# Patient Record
Sex: FEMALE | Race: BLACK OR AFRICAN AMERICAN | NOT HISPANIC OR LATINO | Employment: FULL TIME | URBAN - METROPOLITAN AREA
[De-identification: names, ages, dates, MRNs, and addresses within clinical notes are randomized per-mention and may not be internally consistent; named-entity substitution may affect disease eponyms.]

---

## 2021-08-09 ENCOUNTER — HISTORICAL (OUTPATIENT)
Dept: ADMINISTRATIVE | Facility: HOSPITAL | Age: 46
End: 2021-08-09

## 2021-08-09 LAB — SARS-COV-2 RNA RESP QL NAA+PROBE: NOT DETECTED

## 2022-04-11 ENCOUNTER — HISTORICAL (OUTPATIENT)
Dept: ADMINISTRATIVE | Facility: HOSPITAL | Age: 47
End: 2022-04-11

## 2022-04-27 VITALS
SYSTOLIC BLOOD PRESSURE: 130 MMHG | BODY MASS INDEX: 34.85 KG/M2 | HEIGHT: 65 IN | OXYGEN SATURATION: 97 % | WEIGHT: 209.19 LBS | DIASTOLIC BLOOD PRESSURE: 84 MMHG

## 2022-05-24 ENCOUNTER — TELEPHONE (OUTPATIENT)
Dept: GYNECOLOGY | Facility: CLINIC | Age: 47
End: 2022-05-24
Payer: MEDICAID

## 2022-05-24 NOTE — TELEPHONE ENCOUNTER
Scheduled patient for 6/8 at 2:00PM.       ----- Message from Itzel Agrawal sent at 5/23/2022  3:00 PM CDT -----  Regarding: Add to GYN shanna  Per Dr. Morin to move this pt in 2 wks. Can you add a slot for 6/8 @ 2:15p. Thanks and let me know

## 2022-06-08 ENCOUNTER — OFFICE VISIT (OUTPATIENT)
Dept: GYNECOLOGY | Facility: CLINIC | Age: 47
End: 2022-06-08
Payer: MEDICAID

## 2022-06-08 VITALS
RESPIRATION RATE: 18 BRPM | SYSTOLIC BLOOD PRESSURE: 119 MMHG | WEIGHT: 213.88 LBS | TEMPERATURE: 99 F | OXYGEN SATURATION: 99 % | DIASTOLIC BLOOD PRESSURE: 79 MMHG | HEART RATE: 83 BPM | HEIGHT: 66 IN | BODY MASS INDEX: 34.37 KG/M2

## 2022-06-08 DIAGNOSIS — N93.9 ABNORMAL UTERINE BLEEDING (AUB): Primary | ICD-10-CM

## 2022-06-08 DIAGNOSIS — Z00.00 HEALTHCARE MAINTENANCE: ICD-10-CM

## 2022-06-08 DIAGNOSIS — Z12.4 CERVICAL CANCER SCREENING: ICD-10-CM

## 2022-06-08 PROCEDURE — 87624 HPV HI-RISK TYP POOLED RSLT: CPT

## 2022-06-08 PROCEDURE — 99214 OFFICE O/P EST MOD 30 MIN: CPT | Mod: PBBFAC

## 2022-06-08 RX ORDER — LISINOPRIL 20 MG/1
20 TABLET ORAL DAILY
COMMUNITY
Start: 2022-05-28

## 2022-06-08 RX ORDER — ASPIRIN 325 MG
50000 TABLET, DELAYED RELEASE (ENTERIC COATED) ORAL
COMMUNITY
Start: 2022-06-06

## 2022-06-08 RX ORDER — NORETHINDRONE 5 MG/1
5 TABLET ORAL DAILY
Qty: 30 TABLET | Refills: 11 | Status: SHIPPED | OUTPATIENT
Start: 2022-06-08 | End: 2022-09-16

## 2022-06-08 RX ORDER — FERROUS SULFATE 325(65) MG
TABLET, DELAYED RELEASE (ENTERIC COATED) ORAL 2 TIMES DAILY
COMMUNITY
Start: 2022-03-31

## 2022-06-08 RX ORDER — DOCUSATE SODIUM 100 MG/1
100 CAPSULE, LIQUID FILLED ORAL 2 TIMES DAILY
COMMUNITY
Start: 2022-06-06

## 2022-06-08 NOTE — PROGRESS NOTES
[facility] GYNECOLOGY CLINIC NOTE     Maura Garcia is a 46 y.o.  presenting to GYN clinic for 3 years of menorrhagia.    Patient reports heavy bleeding for the past 3 years. She states that she always had heavier periods but they have increased over the past 3 years. She denies any pain or other discharge. Her periods occur regularly every month with no intermittent bleeding. She states that she bleeds for 7 days and uses about 8 pads and tampons a day. Patient also does not remember the last time that she has a Pap smear, but doesn't remember having any abnormal pap smears. Has previously had an IUD after her first pregnancy over 20 years ago but doesn't remember if it was a copper or hormonal IUD and reports heavier bleeding with it. Also tried OCPs after which had made her nauseous, but doesn't remember which ones she had taken and has not taken them in many years.     Gynecology  10/R/7  Denies h/o STIs or abnormal pap smears    OB History        2    Para   1    Term   1            AB   1    Living   1       SAB   1    IAB        Ectopic        Multiple        Live Births   1                Past Medical History:   Diagnosis Date    Anemia     Breast disorder 2015    cyst removed    Hypertension       Past Surgical History:   Procedure Laterality Date     SECTION      CHOLECYSTECTOMY        Current Outpatient Medications   Medication Instructions    cholecalciferol (vitamin D3) 50,000 Units, Oral, Every 7 days    docusate sodium (COLACE) 100 mg, Oral, 2 times daily    ferrous sulfate 325 (65 FE) MG EC tablet Oral, 2 times daily    lisinopriL (PRINIVIL,ZESTRIL) 20 mg, Oral, Daily    norethindrone (AYGESTIN) 5 mg, Oral, Daily     Social History     Tobacco Use    Smoking status: Never Smoker    Smokeless tobacco: Never Used   Substance Use Topics    Alcohol use: Yes     Alcohol/week: 2.0 standard drinks     Types: 2 Glasses of wine per week     Comment: occassionally     "Drug use: No       Review of Systems  Pertinent items are noted in HPI.     Objective:     /79 (BP Location: Left arm)   Pulse 83   Temp 98.6 °F (37 °C) (Oral)   Resp 18   Ht 5' 6" (1.676 m)   Wt 97 kg (213 lb 13.5 oz)   LMP 2022   SpO2 99%   BMI 34.52 kg/m²   Physical Exam:  Gen: Well-nourished, well-developed female appearing stated age. Alert, cooperative, in no acute distress.  HEENT: No thyromegaly, goiter, or masses  CV: rrr, no murmurs/rubs/gallops  Chest: ctabl, no wheezes/rales/rhonchi  Abdomen: Soft, non-tender, no masses.  Extrem: Extremities normal, atraumatic, non-tender calves.  External genitalia: Normal female genetalia without lesion, discharge or tenderness.   Speculum Exam: Vaginal vault with blood present; without discharge, nonodorous, no lesions/masses seen.  Cervical os visualized as closed, no lesions/masses.   Bimanual Exam: No cervical motion tenderness.  Uterus freely mobile. Uterus approx 10wga, globular. No adnexal masses.  Nontender exam.   Note: RN chaperone present for entirety of exam.    Relevant Labs:   None    Relevant Imaging:  None      Assessment:       46 y.o.  here for heavy menstrual bleeding for the past 3 years.  1. Abnormal uterine bleeding (AUB)  CBC Auto Differential    US Pelvis Comp with Transvag NON-OB (xpd    TSH   2. Cervical cancer screening  Liquid-Based Pap Smear, Screening Screening   3. Healthcare maintenance            Plan:     Problem List Items Addressed This Visit        Renal/    Abnormal uterine bleeding (AUB) - Primary     Discussed management for bleeding with patient as she has not tried medical management in years.  Discussed PO vs IUD and patient opts for PO management at this time. Will start on norethindrone for management.     - Patient has hx of anemia before AUB; takes iron supplements. CBC, TSH ordered today.  - US pelvis ordered  - Start Norethindrone           Relevant Orders    CBC Auto Differential    US " Pelvis Comp with Transvag NON-OB (xpd    TSH    Cervical cancer screening    Relevant Orders    Liquid-Based Pap Smear, Screening Screening       Other    Healthcare maintenance     Follows mammograms with outside doctor and has one scheduled for next month.                 Follow-up in 2 months with virtual visit  o Discuss possible IUD placement    Discussed patient and plan with Dr. Provost Roberto Carlos Nolan, MS3

## 2022-06-13 PROBLEM — N93.9 ABNORMAL UTERINE BLEEDING (AUB): Status: ACTIVE | Noted: 2022-06-13

## 2022-06-13 PROBLEM — Z00.00 HEALTHCARE MAINTENANCE: Status: ACTIVE | Noted: 2022-06-13

## 2022-06-13 PROBLEM — Z12.4 CERVICAL CANCER SCREENING: Status: ACTIVE | Noted: 2022-06-13

## 2022-06-13 NOTE — ASSESSMENT & PLAN NOTE
Discussed management for bleeding with patient as she has not tried medical management in years.  Discussed PO vs IUD and patient opts for PO management at this time. Will start on norethindrone for management.     - Patient has hx of anemia before AUB; takes iron supplements. CBC, TSH ordered today.  - US pelvis ordered  - Start Norethindrone

## 2022-06-15 LAB
HPV16+18+H RISK 12 DNA CVX-IMP: NEGATIVE
INSULIN SERPL-ACNC: NORMAL U[IU]/ML
LAB AP BETHESDA CATEGORY: NORMAL
LAB AP GYN MOLECULAR TESTING: NORMAL
LAB AP LMP DATE: NORMAL
LAB AP OCHS PAP SPECIMEN ADEQUACY: NORMAL
LAB AP OHS PAP INTERPRETATION: NORMAL
LAB AP PAP DISCLAIMER COMMENTS: NORMAL

## 2022-06-23 ENCOUNTER — HOSPITAL ENCOUNTER (OUTPATIENT)
Dept: RADIOLOGY | Facility: HOSPITAL | Age: 47
Discharge: HOME | End: 2022-06-23
Attending: STUDENT IN AN ORGANIZED HEALTH CARE EDUCATION/TRAINING PROGRAM
Payer: MEDICAID

## 2022-06-23 DIAGNOSIS — N93.9 ABNORMAL UTERINE BLEEDING (AUB): ICD-10-CM

## 2022-06-23 PROCEDURE — 76830 TRANSVAGINAL US NON-OB: CPT | Mod: TC

## 2022-08-08 ENCOUNTER — CLINICAL SUPPORT (OUTPATIENT)
Dept: GYNECOLOGY | Facility: CLINIC | Age: 47
End: 2022-08-08
Payer: MEDICAID

## 2022-08-08 DIAGNOSIS — Z12.4 CERVICAL CANCER SCREENING: ICD-10-CM

## 2022-08-08 DIAGNOSIS — N93.9 ABNORMAL UTERINE BLEEDING (AUB): Primary | ICD-10-CM

## 2022-08-08 DIAGNOSIS — D64.9 ANEMIA, UNSPECIFIED TYPE: ICD-10-CM

## 2022-08-08 DIAGNOSIS — D25.1 INTRAMURAL LEIOMYOMA OF UTERUS: ICD-10-CM

## 2022-08-08 NOTE — ASSESSMENT & PLAN NOTE
Well controlled on Aygestin 5mg. TVUS significant for 4cm fibroid.     -- continue Aygestin - advised to call with worsening bleeding. If bleeding clinically worsens, EMB indicated given age and obesity.

## 2022-08-08 NOTE — PROGRESS NOTES
Established Patient - Audio Only Telehealth Visit     The patient location is: Home  The chief complaint leading to consultation is: AUB  Visit type: Virtual visit with audio only (telephone)  Total time spent with patient: 10min       The reason for the audio only service rather than synchronous audio and video virtual visit was related to technical difficulties or patient preference/necessity.     Each patient to whom I provide medical services by telemedicine is:  (1) informed of the relationship between the physician and patient and the respective role of any other health care provider with respect to management of the patient; and (2) notified that they may decline to receive medical services by telemedicine and may withdraw from such care at any time. Patient verbally consented to receive this service via voice-only telephone call.        [facility] Progress Note      Chief Complaint: AUB-L, anemia follow up    HPI  Maura Garcia joined me via our telemedicine platform for AUB-L, currently well controlled on Aygestin 5mg qd started at last visit on 6/8. Pt endorses intermittent spotting only since initiation, denies dysmenorrhea or heavy menses. TVUS 6/23/2022 reviewed - 9.5cm uterus with 2 fibroids, largest 4.1 x 4.8 x 4.7 cm.     Pt went to her PCP Joanne Naik MD ([place], [place]) for CBC - noted to be anemic per patient (no records) and is taking PO Iron TID. Pt c/o occasional light headedness or dizziness. Advised pt to go to ED for severe dizziness, presyncope or palpitations as this may indicate severe anemia.     Pt is otherwise well - Last Pap NILM, HPV negative (6/2022). Next due in 2027.     I have reviewed family history, social history, medications and allergies as documented in the patient's electronic medical record.    Reports, labs, outside records, and images have been reviewed with pertinent interpretations below. See telemedicine notes records for intervening  communications.        Assessment/Plan  Problem List Items Addressed This Visit        Renal/    Abnormal uterine bleeding (AUB) - Primary     Well controlled on Aygestin 5mg. TVUS significant for 4cm fibroid.     -- continue Aygestin - advised to call with worsening bleeding. If bleeding clinically worsens, EMB indicated given age and obesity.            Cervical cancer screening     Last pap NILM, HPV negative (6/2022). Next due in 5 years.            Intramural leiomyoma of uterus       Oncology    Anemia     TSH & CBC performed at outside lab, pt prescribed Iron tid by PCP.     -- Continue iron TID, advised pt to present to ED for symptomatic anemia.                    See correspondence above for plan.   Patient's needs assessed and health education provided. Patient understands risks, benefits, and alternatives of treatment prescribed above. Patient verbalizes understanding and agrees to follow plan.    Patient's identity was confirmed and confidentiality/privacy confirmed prior to visit. I certify that this visit was done via secure two-way transmission with informed consent of the patient and/or guardian.  Each patient to whom I provide medical services by telemedicine is:  (1) informed of the relationship between the physician and patient and the respective role of any other health care provider with respect to management of the patient; and (2) notified that they may decline to receive medical services by telemedicine and may withdraw from such care at any time. Patient verbally consented to receive this service via voice-only telephone call.    Audio only was selected because there was no capability for video conferencing with patient.      8min of the time was counseling or coordinating care.  Start Time: 1016  End Time: 1026    Norma Boyer MD  [employer] [facility] PGY4

## 2022-08-08 NOTE — ASSESSMENT & PLAN NOTE
TSH & CBC performed at outside lab, pt prescribed Iron tid by PCP.     -- Continue iron TID, advised pt to present to ED for symptomatic anemia.

## 2022-09-12 ENCOUNTER — NURSE TRIAGE (OUTPATIENT)
Dept: ADMINISTRATIVE | Facility: CLINIC | Age: 47
End: 2022-09-12
Payer: MEDICAID

## 2022-09-12 PROBLEM — Z00.00 HEALTHCARE MAINTENANCE: Status: RESOLVED | Noted: 2022-06-13 | Resolved: 2022-09-12

## 2022-09-12 NOTE — TELEPHONE ENCOUNTER
C/o ongoing vaginal bleeding since starting new birth control. States initially bled for 2 weeks, resolved, & then started back 3 week ago & has been bleeding since. Reports dark red blood, using liner & tampon, states changing tampon every hour, but that it is not full. Wanting to be seen today.    Unable to schedule with OBGYN, priority message to be routed to clinic. Pt advised if she does not hear back from clinic by approx 1pm, and/or symptoms change or worsen, to f/u in UC/ER  at that time.     Reason for Disposition   Patient wants to be seen    Additional Information   Negative: Passed out (i.e., fainted, collapsed and was not responding)   Negative: Difficult to awaken or acting confused (e.g., disoriented, slurred speech)   Negative: Shock suspected (e.g., cold/pale/clammy skin, too weak to stand)   Negative: SEVERE bleeding (e.g., continuous red blood from vagina, or large blood clots) and very weak (can't stand)   Negative: Sounds like a life-threatening emergency to the triager   Negative: SEVERE abdominal pain (e.g., excruciating)   Negative: SEVERE dizziness (e.g., unable to stand, requires support to walk, feels like passing out now)   Negative: Passed tissue (e.g., gray-white)   Negative: SEVERE vaginal bleeding (i.e., soaking 2 pads or tampons per hour and present 2 or more hours)   Negative: MODERATE vaginal bleeding (i.e., soaking pad or tampon per hour and present > 6 hours)   Negative: Pale skin (pallor) of new onset or worsening   Negative: Constant abdominal pain lasting > 2 hours   Negative: Patient sounds very sick or weak to the triager   Negative: Taking Coumadin (warfarin) or other strong blood thinner, or known bleeding disorder (e.g., thrombocytopenia)   Negative: Skin bruises or nosebleed and not caused by an injury   Negative: Bleeding/spotting after procedure (e.g., biopsy) or pelvic examination (e.g., pap smear) that persists > 3 days    Protocols used: Vaginal Bleeding -  [id number]

## 2022-09-13 ENCOUNTER — TELEPHONE (OUTPATIENT)
Dept: GYNECOLOGY | Facility: CLINIC | Age: 47
End: 2022-09-13
Payer: MEDICAID

## 2022-09-16 ENCOUNTER — OFFICE VISIT (OUTPATIENT)
Dept: GYNECOLOGY | Facility: CLINIC | Age: 47
End: 2022-09-16
Payer: MEDICAID

## 2022-09-16 VITALS
TEMPERATURE: 99 F | WEIGHT: 216 LBS | RESPIRATION RATE: 18 BRPM | SYSTOLIC BLOOD PRESSURE: 146 MMHG | HEART RATE: 89 BPM | BODY MASS INDEX: 34.72 KG/M2 | HEIGHT: 66 IN | DIASTOLIC BLOOD PRESSURE: 95 MMHG | OXYGEN SATURATION: 99 %

## 2022-09-16 DIAGNOSIS — D25.1 INTRAMURAL LEIOMYOMA OF UTERUS: ICD-10-CM

## 2022-09-16 DIAGNOSIS — N93.9 ABNORMAL UTERINE BLEEDING (AUB): Primary | ICD-10-CM

## 2022-09-16 DIAGNOSIS — Z12.31 ENCOUNTER FOR SCREENING MAMMOGRAM FOR MALIGNANT NEOPLASM OF BREAST: ICD-10-CM

## 2022-09-16 LAB
B-HCG UR QL: NEGATIVE
CTP QC/QA: YES

## 2022-09-16 PROCEDURE — 58100 BIOPSY OF UTERUS LINING: CPT | Mod: PBBFAC | Performed by: STUDENT IN AN ORGANIZED HEALTH CARE EDUCATION/TRAINING PROGRAM

## 2022-09-16 PROCEDURE — 81025 URINE PREGNANCY TEST: CPT | Mod: PBBFAC

## 2022-09-16 PROCEDURE — 99214 OFFICE O/P EST MOD 30 MIN: CPT | Mod: PBBFAC

## 2022-09-16 RX ORDER — NORETHINDRONE 5 MG/1
10 TABLET ORAL DAILY
Qty: 60 TABLET | Refills: 11 | Status: SHIPPED | OUTPATIENT
Start: 2022-09-16 | End: 2023-09-06 | Stop reason: SDUPTHER

## 2022-09-16 NOTE — PROGRESS NOTES
"  [facility] GYNECOLOGY CLINIC NOTE     Maura Garcia is a 47 y.o.  presenting to GYN clinic for follow up AUB. Pt last seen 2022 via telemed. Started aygestin 5mg in 2022 for AUB-L, noted to have 4cm fibroid abutting endometrial cavity. Endorses initial improvement in bleeding with aygestin, however now endorses increased bleeding. Denies dysmenorrhea. Denies urinary complaints, abnormal discharge, vaginal dryness.       Gynecology  OB History          2    Para   1    Term   1            AB   1    Living   1         SAB   1    IAB        Ectopic        Multiple        Live Births   1                Past Medical History:   Diagnosis Date    Anemia     Breast disorder     cyst removed    Hypertension       Past Surgical History:   Procedure Laterality Date     SECTION      CHOLECYSTECTOMY        Current Outpatient Medications   Medication Instructions    cholecalciferol (vitamin D3) 50,000 Units, Oral, Every 7 days    docusate sodium (COLACE) 100 mg, Oral, 2 times daily    ferrous sulfate 325 (65 FE) MG EC tablet Oral, 2 times daily    lisinopriL (PRINIVIL,ZESTRIL) 20 mg, Oral, Daily    norethindrone (AYGESTIN) 5 mg, Oral, Daily     Social History     Tobacco Use    Smoking status: Never    Smokeless tobacco: Never   Substance Use Topics    Alcohol use: Yes     Alcohol/week: 2.0 standard drinks     Types: 2 Glasses of wine per week     Comment: occassionally    Drug use: No       Review of Systems  Pertinent items are noted in HPI.     Objective:     BP (!) 146/95   Pulse 89   Temp 98.7 °F (37.1 °C) (Oral)   Resp 18   Ht 5' 6" (1.676 m)   Wt 98 kg (216 lb)   LMP 2022 Comment: Cycle since   SpO2 99%   BMI 34.86 kg/m²       Objective:     BP (!) 146/95   Pulse 89   Temp 98.7 °F (37.1 °C) (Oral)   Resp 18   Ht 5' 6" (1.676 m)   Wt 98 kg (216 lb)   LMP 2022 Comment: Cycle since   SpO2 99%   BMI 34.86 kg/m²   Physical Exam:  Gen: " Well-nourished, well-developed female appearing stated age. Alert, cooperative, in no acute distress.  CV: regular rate  Chest: no conversational dyspnea  Abdomen: Soft, non-tender, no masses.  Extrem: Extremities normal, atraumatic, non-tender calves.  External genitalia: Normal female genetalia without lesion, discharge or tenderness.   Speculum Exam: Vaginal vault with moderate blood in vault.  Cervical os visualized as parous, no lesions/masses.   Bimanual Exam: No cervical motion tenderness.  Uterus freely mobile.  8 week size uterus. No adnexal masses.  Nontender exam.   Note: RN chaperone present for entirety of exam.    TVUS 6/2022:   FINDINGS:  The uterus measures 9.5 x 6.5 x 6.6 cm and is retroverted.     There are 2 leiomyoma of the uterus.  The 1st measures 4.1 x 4.8 x 4.7 cm.  The 2nd measures 2.3 x 2.3 x 1.8 cm.     The endometrial stripe is not well demonstrated and is somewhat compressed by the leiomyoma.  Endovaginally it is measured at 4 mm.     There are multiple nabothian cyst.     The right ovary measures 2 by 2.8 x 1.5 cm.  There are follicles present.  There is flow present.     The left ovary measures 1.6 by 2.3 x 1.4 cm.  There are follicles present.  There is flow present.     Impression:     Uterus is retroverted with 2   leiomyoma.    Endometrial biopsy    Date/Time: 9/16/2022 9:00 AM  Performed by: Azra Badillo MD  Authorized by: Gopi Villegas MD     Consent:     Consent obtained:  Verbal and written    Consent given by:  Patient    Patient questions answered: yes      Patient agrees, verbalizes understanding, and wants to proceed: yes    Indication:     Indications: Other disorder of menstruation and other abnormal bleeding from female genital tract    Procedure:     Procedure: endometrial biopsy with Pipelle      Cervix cleaned and prepped: yes      Uterus sounded: yes      Uterus sound depth (cm):  8    Curettes used:  1    Specimen collected: specimen collected and sent to  pathology      Patient tolerated procedure well with no complications: yes      Assessment:       47 y.o.  here for AUB-L management, EMB performed today.  1. Abnormal uterine bleeding (AUB)  POCT Urine Pregnancy    norethindrone (AYGESTIN) 5 mg Tab    Specimen to Pathology Gynecology and Obstetrics    Endometrial biopsy      2. Encounter for screening mammogram for malignant neoplasm of breast  Mammo Digital Screening Bilat      3. Intramural leiomyoma of uterus               Plan:         Problem List Items Addressed This Visit          Renal/    Abnormal uterine bleeding (AUB) - Primary     Given pt with increased bleeding on aygestin and risk factors for endometrial hyperplasia EMB collected today. Aygestin increased to 10mg daily. Pt not interested in surgical management at this time.          Relevant Medications    norethindrone (AYGESTIN) 5 mg Tab    Other Relevant Orders    POCT Urine Pregnancy (Completed)    Specimen to Pathology Gynecology and Obstetrics    Endometrial biopsy    Intramural leiomyoma of uterus     4cm fibroid abutting cavity. Pt declines hysterectomy, would like to maximize medical management. Discussed not candidate for IUD given location of fibroid.           Other Visit Diagnoses       Encounter for screening mammogram for malignant neoplasm of breast        Relevant Orders    Mammo Digital Screening Bilat            Will call with EMB results, follow up AUB at that time. Return to clinic for well woman, sooner if indicated based on EMB and AUB sx    Discussed patient and plan with Dr. Villegas

## 2022-09-17 NOTE — ASSESSMENT & PLAN NOTE
Given pt with increased bleeding on aygestin and risk factors for endometrial hyperplasia EMB collected today. Aygestin increased to 10mg daily. Pt not interested in surgical management at this time.

## 2022-09-17 NOTE — ASSESSMENT & PLAN NOTE
4cm fibroid abutting cavity. Pt declines hysterectomy, would like to maximize medical management. Discussed not candidate for IUD given location of fibroid.

## 2022-09-17 NOTE — PROCEDURES
Endometrial biopsy    Date/Time: 9/16/2022 9:00 AM  Performed by: Azra Badillo MD  Authorized by: Gopi Villegas MD     Consent:     Consent obtained:  Verbal and written    Consent given by:  Patient    Patient questions answered: yes      Patient agrees, verbalizes understanding, and wants to proceed: yes    Indication:     Indications: Other disorder of menstruation and other abnormal bleeding from female genital tract    Procedure:     Procedure: endometrial biopsy with Pipelle      Cervix cleaned and prepped: yes      Uterus sounded: yes      Uterus sound depth (cm):  8    Curettes used:  1    Specimen collected: specimen collected and sent to pathology      Patient tolerated procedure well with no complications: yes

## 2022-09-19 LAB
ESTROGEN SERPL-MCNC: NORMAL PG/ML
INSULIN SERPL-ACNC: NORMAL U[IU]/ML
LAB AP CLINICAL INFORMATION: NORMAL
LAB AP GROSS DESCRIPTION: NORMAL
LAB AP REPORT FOOTNOTES: NORMAL
T3RU NFR SERPL: NORMAL %

## 2022-09-26 ENCOUNTER — LAB VISIT (OUTPATIENT)
Dept: LAB | Facility: HOSPITAL | Age: 47
End: 2022-09-26
Attending: STUDENT IN AN ORGANIZED HEALTH CARE EDUCATION/TRAINING PROGRAM
Payer: MEDICAID

## 2022-09-26 DIAGNOSIS — N93.9 ABNORMAL UTERINE BLEEDING (AUB): ICD-10-CM

## 2022-09-26 LAB
BASOPHILS # BLD AUTO: 0.05 X10(3)/MCL (ref 0–0.2)
BASOPHILS NFR BLD AUTO: 0.7 %
EOSINOPHIL # BLD AUTO: 0.25 X10(3)/MCL (ref 0–0.9)
EOSINOPHIL NFR BLD AUTO: 3.7 %
ERYTHROCYTE [DISTWIDTH] IN BLOOD BY AUTOMATED COUNT: 19.7 % (ref 11.5–17)
HCT VFR BLD AUTO: 31.3 % (ref 37–47)
HGB BLD-MCNC: 9.5 GM/DL (ref 12–16)
IMM GRANULOCYTES # BLD AUTO: 0.02 X10(3)/MCL (ref 0–0.04)
IMM GRANULOCYTES NFR BLD AUTO: 0.3 %
LYMPHOCYTES # BLD AUTO: 1.72 X10(3)/MCL (ref 0.6–4.6)
LYMPHOCYTES NFR BLD AUTO: 25.6 %
MCH RBC QN AUTO: 24.4 PG (ref 27–31)
MCHC RBC AUTO-ENTMCNC: 30.4 MG/DL (ref 33–36)
MCV RBC AUTO: 80.5 FL (ref 80–94)
MONOCYTES # BLD AUTO: 0.46 X10(3)/MCL (ref 0.1–1.3)
MONOCYTES NFR BLD AUTO: 6.8 %
NEUTROPHILS # BLD AUTO: 4.2 X10(3)/MCL (ref 2.1–9.2)
NEUTROPHILS NFR BLD AUTO: 62.9 %
NRBC BLD AUTO-RTO: 0 %
PLATELET # BLD AUTO: 385 X10(3)/MCL (ref 130–400)
PMV BLD AUTO: 9.7 FL (ref 7.4–10.4)
RBC # BLD AUTO: 3.89 X10(6)/MCL (ref 4.2–5.4)
TSH SERPL-ACNC: 1.59 UIU/ML (ref 0.35–4.94)
WBC # SPEC AUTO: 6.7 X10(3)/MCL (ref 4.5–11.5)

## 2022-09-26 PROCEDURE — 36415 COLL VENOUS BLD VENIPUNCTURE: CPT

## 2022-09-26 PROCEDURE — 85025 COMPLETE CBC W/AUTO DIFF WBC: CPT

## 2022-09-26 PROCEDURE — 84443 ASSAY THYROID STIM HORMONE: CPT

## 2022-09-28 ENCOUNTER — HOSPITAL ENCOUNTER (OUTPATIENT)
Dept: RADIOLOGY | Facility: HOSPITAL | Age: 47
Discharge: HOME | End: 2022-09-28
Attending: STUDENT IN AN ORGANIZED HEALTH CARE EDUCATION/TRAINING PROGRAM
Payer: MEDICAID

## 2022-09-28 DIAGNOSIS — Z12.31 ENCOUNTER FOR SCREENING MAMMOGRAM FOR MALIGNANT NEOPLASM OF BREAST: ICD-10-CM

## 2022-09-28 PROCEDURE — 77063 BREAST TOMOSYNTHESIS BI: CPT | Mod: 26,,, | Performed by: RADIOLOGY

## 2022-09-28 PROCEDURE — 77063 MAMMO DIGITAL SCREENING BILAT WITH TOMO: ICD-10-PCS | Mod: 26,,, | Performed by: RADIOLOGY

## 2022-09-28 PROCEDURE — 77067 SCR MAMMO BI INCL CAD: CPT | Mod: 26,,, | Performed by: RADIOLOGY

## 2022-09-28 PROCEDURE — 77067 MAMMO DIGITAL SCREENING BILAT WITH TOMO: ICD-10-PCS | Mod: 26,,, | Performed by: RADIOLOGY

## 2022-09-28 PROCEDURE — 77067 SCR MAMMO BI INCL CAD: CPT | Mod: TC

## 2022-10-05 ENCOUNTER — TELEPHONE (OUTPATIENT)
Dept: GYNECOLOGY | Facility: CLINIC | Age: 47
End: 2022-10-05
Payer: MEDICAID

## 2022-10-05 NOTE — TELEPHONE ENCOUNTER
Patient calling wanting mammo results but have not been signed by radiologist yet.  Explained to her that she can watch out for it on her portal and usually a letter with results are mailed.

## 2023-03-30 ENCOUNTER — NURSE TRIAGE (OUTPATIENT)
Dept: ADMINISTRATIVE | Facility: CLINIC | Age: 48
End: 2023-03-30
Payer: MEDICAID

## 2023-03-31 NOTE — TELEPHONE ENCOUNTER
"Pt states she has a small "blister" type pimple on the edge of her eyelid that has been causing pain (6/10) since yesterday. She also states her vision has been blurry and her eye has been watering. Care advice is to be seen by HCP within 4 hours. Declined OCA; recommended UCC or ED. Pt verbalized understanding.     Reason for Disposition   [1] Blurred vision AND [2] new or worsening    Additional Information   [1] Tender, red lump or pimple AND [2] located along the eyelid margin   Negative: Patient sounds very sick or weak to the triager   Negative: [1] Eyelid is red AND [2] fever   Negative: [1] Eyelid is swollen AND [2] fever   Negative: Redness spreads around the eye (both upper and lower eyelid are red)    Protocols used: Eye Pain and Other Symptoms-A-AH, Sty-A-AH    "

## 2023-09-06 DIAGNOSIS — N93.9 ABNORMAL UTERINE BLEEDING (AUB): ICD-10-CM

## 2023-09-06 RX ORDER — NORETHINDRONE 5 MG/1
10 TABLET ORAL DAILY
Qty: 60 TABLET | Refills: 11 | Status: SHIPPED | OUTPATIENT
Start: 2023-09-06 | End: 2024-09-05

## 2023-09-20 ENCOUNTER — OFFICE VISIT (OUTPATIENT)
Dept: GYNECOLOGY | Facility: CLINIC | Age: 48
End: 2023-09-20
Payer: MEDICAID

## 2023-09-20 VITALS
DIASTOLIC BLOOD PRESSURE: 87 MMHG | OXYGEN SATURATION: 100 % | HEIGHT: 66 IN | BODY MASS INDEX: 36.32 KG/M2 | TEMPERATURE: 98 F | HEART RATE: 87 BPM | RESPIRATION RATE: 20 BRPM | WEIGHT: 226 LBS | SYSTOLIC BLOOD PRESSURE: 143 MMHG

## 2023-09-20 DIAGNOSIS — N93.9 ABNORMAL UTERINE BLEEDING (AUB): ICD-10-CM

## 2023-09-20 DIAGNOSIS — Z01.419 ENCOUNTER FOR ANNUAL ROUTINE GYNECOLOGICAL EXAMINATION: Primary | ICD-10-CM

## 2023-09-20 DIAGNOSIS — Z12.31 VISIT FOR SCREENING MAMMOGRAM: ICD-10-CM

## 2023-09-20 DIAGNOSIS — Z12.11 COLON CANCER SCREENING: ICD-10-CM

## 2023-09-20 PROCEDURE — 3077F PR MOST RECENT SYSTOLIC BLOOD PRESSURE >= 140 MM HG: ICD-10-PCS | Mod: CPTII,,, | Performed by: NURSE PRACTITIONER

## 2023-09-20 PROCEDURE — 3008F PR BODY MASS INDEX (BMI) DOCUMENTED: ICD-10-PCS | Mod: CPTII,,, | Performed by: NURSE PRACTITIONER

## 2023-09-20 PROCEDURE — 4010F ACE/ARB THERAPY RXD/TAKEN: CPT | Mod: CPTII,,, | Performed by: NURSE PRACTITIONER

## 2023-09-20 PROCEDURE — 99396 PREV VISIT EST AGE 40-64: CPT | Mod: S$PBB,,, | Performed by: NURSE PRACTITIONER

## 2023-09-20 PROCEDURE — 3079F PR MOST RECENT DIASTOLIC BLOOD PRESSURE 80-89 MM HG: ICD-10-PCS | Mod: CPTII,,, | Performed by: NURSE PRACTITIONER

## 2023-09-20 PROCEDURE — 1159F PR MEDICATION LIST DOCUMENTED IN MEDICAL RECORD: ICD-10-PCS | Mod: CPTII,,, | Performed by: NURSE PRACTITIONER

## 2023-09-20 PROCEDURE — 3008F BODY MASS INDEX DOCD: CPT | Mod: CPTII,,, | Performed by: NURSE PRACTITIONER

## 2023-09-20 PROCEDURE — 3077F SYST BP >= 140 MM HG: CPT | Mod: CPTII,,, | Performed by: NURSE PRACTITIONER

## 2023-09-20 PROCEDURE — 1159F MED LIST DOCD IN RCRD: CPT | Mod: CPTII,,, | Performed by: NURSE PRACTITIONER

## 2023-09-20 PROCEDURE — 99214 OFFICE O/P EST MOD 30 MIN: CPT | Mod: PBBFAC | Performed by: NURSE PRACTITIONER

## 2023-09-20 PROCEDURE — 3079F DIAST BP 80-89 MM HG: CPT | Mod: CPTII,,, | Performed by: NURSE PRACTITIONER

## 2023-09-20 PROCEDURE — 4010F PR ACE/ARB THEARPY RXD/TAKEN: ICD-10-PCS | Mod: CPTII,,, | Performed by: NURSE PRACTITIONER

## 2023-09-20 PROCEDURE — 99396 PR PREVENTIVE VISIT,EST,40-64: ICD-10-PCS | Mod: S$PBB,,, | Performed by: NURSE PRACTITIONER

## 2023-09-20 RX ORDER — HYDROCHLOROTHIAZIDE 25 MG/1
25 TABLET ORAL
COMMUNITY
Start: 2023-09-11

## 2023-09-20 NOTE — PROGRESS NOTES
"  Subjective:       Patient ID: Padma Garcia is a 48 y.o. female.    Chief Complaint:  Gynecologic Exam      History of Present Illness  The patient  here for annual exam. Pt with hx of AUB-L, noted to have 4cm fibroid abutting endometrial cavity. She had EMB with GYN and started on aygestin 5mg in 2022. Pt is amenorrheic with aygestin, however she did run out a few days ago and currently spotting. Denies history of abnormal paps. Last pap -NIL and HPV neg. MG-10/10/22 & BIRADS 2. Denies breast or urinary complaints. Denies pelvic pain, abnormal bleeding or discharge. Pt reports no STIs in the past and no concerns. Denies tobacco use. Dep. screening 0. Denies fly hx of breast, ovarian, uterine or colon cancer. PCP in [place].    GYN & OB History  No LMP recorded. (Menstrual status: Other).   Date of Last Pap: 6/15/2022    Review of patient's allergies indicates:  No Known Allergies  Past Medical History:   Diagnosis Date    Anemia     Breast disorder     cyst removed    Hypertension      OB History    Para Term  AB Living   2 1 1   1 1   SAB IAB Ectopic Multiple Live Births   1       1      # Outcome Date GA Lbr Joao/2nd Weight Sex Delivery Anes PTL Lv   2 SAB 2002    U    FD   1 Term 1997    M [id number]-[id number]  N LEILA        Review of Systems  Review of Systems    Negative except for pertinent findings for positives per HPI     Objective:    Physical Exam    BP (!) 143/87 (BP Location: Left arm, Patient Position: Sitting, BP Method: Medium (Automatic))   Pulse 87   Temp 97.9 °F (36.6 °C) (Oral)   Resp 20   Ht 5' 6" (1.676 m)   Wt 102.5 kg (226 lb)   SpO2 100%   BMI 36.48 kg/m²   GENERAL: Well-developed female in no acute distress.  SKIN: Normal to inspection, warm and intact.  BREASTS: No masses, lumps, discharge, tenderness.  ABDOMEN: Soft, non tender.  VULVA: General appearance normal; external genitalia with no lesions or erythema.  VAGINA: Mucosa pink, scant blood, no abnormal " discharge or lesions.  CERVIX: Pink, no erythema or abnormal discharge.  BIMANUAL EXAM: reveals a 12 week-sized uterus. The uterus is non tender. Derick adnexa reveal no tenderness.  PSYCHIATRIC: Patient is oriented to person, place, and time. Mood and affect are normal.    Assessment:       1. Encounter for annual routine gynecological examination    2. Abnormal uterine bleeding (AUB)    3. Visit for screening mammogram    4. Colon cancer screening       Plan:   Padma was seen today for gynecologic exam.    Diagnoses and all orders for this visit:    Encounter for annual routine gynecological examination    Abnormal uterine bleeding (AUB)    Visit for screening mammogram  -     Mammo Digital Screening Bilat w/ Popeye; Future    Colon cancer screening  -     Cologuard Screening (Multitarget Stool DNA); Future  -     Cologuard Screening (Multitarget Stool DNA)    Pelvic today, pap utd per ACOG  Continue Norethindrone 5 mg BID for AUB, refills sent this month per GYN  MG ordered  Cologuard ordered  Follow up in about 1 year (around 9/20/2024) for annual exam.

## 2023-10-02 ENCOUNTER — HOSPITAL ENCOUNTER (OUTPATIENT)
Dept: RADIOLOGY | Facility: HOSPITAL | Age: 48
Discharge: HOME | End: 2023-10-02
Attending: NURSE PRACTITIONER
Payer: MEDICAID

## 2023-10-02 DIAGNOSIS — Z12.31 VISIT FOR SCREENING MAMMOGRAM: ICD-10-CM

## 2023-10-02 LAB — NONINV COLON CA DNA+OCC BLD SCRN STL QL: NEGATIVE

## 2023-10-02 PROCEDURE — 77067 SCR MAMMO BI INCL CAD: CPT | Mod: 26,,, | Performed by: RADIOLOGY

## 2023-10-02 PROCEDURE — 77067 SCR MAMMO BI INCL CAD: CPT | Mod: TC

## 2023-10-02 PROCEDURE — 77067 MAMMO DIGITAL SCREENING BILAT WITH TOMO: ICD-10-PCS | Mod: 26,,, | Performed by: RADIOLOGY

## 2023-10-02 PROCEDURE — 77063 BREAST TOMOSYNTHESIS BI: CPT | Mod: 26,,, | Performed by: RADIOLOGY

## 2023-10-02 PROCEDURE — 77063 MAMMO DIGITAL SCREENING BILAT WITH TOMO: ICD-10-PCS | Mod: 26,,, | Performed by: RADIOLOGY

## 2024-09-16 DIAGNOSIS — N93.9 ABNORMAL UTERINE BLEEDING (AUB): ICD-10-CM

## 2024-09-16 RX ORDER — NORETHINDRONE 5 MG/1
10 TABLET ORAL DAILY
Qty: 60 TABLET | Refills: 1 | Status: SHIPPED | OUTPATIENT
Start: 2024-09-16

## 2024-09-24 ENCOUNTER — OFFICE VISIT (OUTPATIENT)
Dept: GYNECOLOGY | Facility: CLINIC | Age: 49
End: 2024-09-24
Payer: COMMERCIAL

## 2024-09-24 VITALS
BODY MASS INDEX: 37.67 KG/M2 | HEART RATE: 95 BPM | TEMPERATURE: 99 F | SYSTOLIC BLOOD PRESSURE: 120 MMHG | OXYGEN SATURATION: 100 % | WEIGHT: 234.38 LBS | RESPIRATION RATE: 20 BRPM | DIASTOLIC BLOOD PRESSURE: 75 MMHG | HEIGHT: 66 IN

## 2024-09-24 DIAGNOSIS — Z01.419 ENCOUNTER FOR ANNUAL ROUTINE GYNECOLOGICAL EXAMINATION: Primary | ICD-10-CM

## 2024-09-24 DIAGNOSIS — N93.9 ABNORMAL UTERINE BLEEDING (AUB): ICD-10-CM

## 2024-09-24 DIAGNOSIS — Z12.31 VISIT FOR SCREENING MAMMOGRAM: ICD-10-CM

## 2024-09-24 PROCEDURE — 99214 OFFICE O/P EST MOD 30 MIN: CPT | Mod: PBBFAC | Performed by: NURSE PRACTITIONER

## 2024-09-24 PROCEDURE — 3078F DIAST BP <80 MM HG: CPT | Mod: CPTII,,, | Performed by: NURSE PRACTITIONER

## 2024-09-24 PROCEDURE — 99396 PREV VISIT EST AGE 40-64: CPT | Mod: S$PBB,,, | Performed by: NURSE PRACTITIONER

## 2024-09-24 PROCEDURE — 4010F ACE/ARB THERAPY RXD/TAKEN: CPT | Mod: CPTII,,, | Performed by: NURSE PRACTITIONER

## 2024-09-24 PROCEDURE — 3074F SYST BP LT 130 MM HG: CPT | Mod: CPTII,,, | Performed by: NURSE PRACTITIONER

## 2024-09-24 PROCEDURE — 3008F BODY MASS INDEX DOCD: CPT | Mod: CPTII,,, | Performed by: NURSE PRACTITIONER

## 2024-09-24 PROCEDURE — 1159F MED LIST DOCD IN RCRD: CPT | Mod: CPTII,,, | Performed by: NURSE PRACTITIONER

## 2024-09-24 RX ORDER — NORETHINDRONE 5 MG/1
10 TABLET ORAL DAILY
Qty: 60 TABLET | Refills: 12 | Status: SHIPPED | OUTPATIENT
Start: 2024-09-24

## 2024-09-24 NOTE — PROGRESS NOTES
"  [facility] -  Gynecology / Women's Health Clinic      Subjective:       Patient ID: Padma Garcia is a 49 y.o. female.    Chief Complaint:  Gynecologic Exam    History of Present Illness  The patient  here for annual exam. Pt with hx of AUB-L, noted to have 4cm fibroid abutting endometrial cavity. She had EMB with GYN and started on aygestin 10 mg in 2022. Pt is amenorrheic with aygestin and wishes to continue. Denies history of abnormal paps. Last pap -NIL and HPV neg. MG-10/2/23 & BIRADS 2. Lt benign breast bx. Admits to occ tenderness at site. Denies urinary complaints. Denies pelvic pain, abnormal bleeding or discharge. Pt reports no STIs in the past and no concerns. Denies tobacco use. Dep. screening 0. Denies fly hx of breast, ovarian, uterine or colon cancer.      GYN & OB History  No LMP recorded. (Menstrual status: Other).   Date of Last Pap: 6/15/2022    Review of patient's allergies indicates:  No Known Allergies  Past Medical History:   Diagnosis Date    Anemia     Breast disorder 2015    cyst removed    Hypertension      OB History    Para Term  AB Living   2 1 1   1 1   SAB IAB Ectopic Multiple Live Births   1       1      # Outcome Date GA Lbr Joao/2nd Weight Sex Type Anes PTL Lv   2 2002    U    FD   1 Term 1997    M CS-LTranv  N LEILA        Review of Systems  Review of Systems    Negative except for pertinent findings for positives per HPI     Objective:    Physical Exam    /75 (BP Location: Right arm, Patient Position: Sitting, BP Method: Large (Automatic))   Pulse 95   Temp 98.6 °F (37 °C) (Oral)   Resp 20   Ht 5' 6" (1.676 m)   Wt 106.3 kg (234 lb 6.4 oz)   SpO2 100%   BMI 37.83 kg/m²   GENERAL: Well-developed female. No acute distress.    SKIN: Normal to inspection, warm and intact.  BREASTS: No rashes or erythema. No masses, lumps, discharge, tenderness.  VULVA: General appearance normal; external genitalia with no lesions or " erythema.  VAGINA: Mucosa/vaginal vault pink, no abnormal discharge or lesions.  CERVIX: Pink, parous appearing os, no erythema or abnormal discharge.  BIMANUAL EXAM: reveals a 12 week-sized uterus. The uterus is regular, mobile, and non tender. Derick adnexa reveal no evidence of masses, tenderness.  PSYCHIATRIC: Patient is oriented to person, place, and time. Mood and affect are normal.    Assessment:         ICD-10-CM ICD-9-CM   1. Encounter for annual routine gynecological examination  Z01.419 V72.31   2. Visit for screening mammogram  Z12.31 V76.12   3. Abnormal uterine bleeding (AUB)  N93.9 626.9     Plan:   Padma was seen today for gynecologic exam.    Diagnoses and all orders for this visit:    Encounter for annual routine gynecological examination    Visit for screening mammogram    Abnormal uterine bleeding (AUB)  -     norethindrone (AYGESTIN) 5 mg Tab; Take 2 tablets (10 mg total) by mouth once daily.    Pelvic today, pap utd per ACOG  MG order for pt to take to [facility] per insurance coverage  Continue Aygestin 10 mg daily  Follow up in about 1 year (around 9/24/2025) for annual exam.